# Patient Record
Sex: MALE | Race: BLACK OR AFRICAN AMERICAN | NOT HISPANIC OR LATINO | Employment: OTHER | URBAN - METROPOLITAN AREA
[De-identification: names, ages, dates, MRNs, and addresses within clinical notes are randomized per-mention and may not be internally consistent; named-entity substitution may affect disease eponyms.]

---

## 2017-08-12 ENCOUNTER — ANESTHESIA EVENT (OUTPATIENT)
Dept: SURGERY | Facility: CLINIC | Age: 27
End: 2017-08-12
Payer: COMMERCIAL

## 2017-09-01 RX ORDER — BUDESONIDE 0.5 MG/2ML
0.5 INHALANT ORAL 2 TIMES DAILY PRN
COMMUNITY

## 2017-09-01 NOTE — OR NURSING
PAN RN did Pre Call with Mother/Legal Guardian, Natalia Jesus.  She was intstructed to bring Legal Guardian Documents on DOS , as she will be accompanying patient on DOS.  Cell: [phone]

## 2017-09-06 ENCOUNTER — ANESTHESIA (OUTPATIENT)
Dept: SURGERY | Facility: CLINIC | Age: 27
End: 2017-09-06
Payer: COMMERCIAL

## 2017-09-06 ENCOUNTER — HOSPITAL ENCOUNTER (OUTPATIENT)
Facility: CLINIC | Age: 27
Discharge: HOME | End: 2017-09-06
Attending: DENTIST | Admitting: DENTIST
Payer: COMMERCIAL

## 2017-09-06 VITALS
DIASTOLIC BLOOD PRESSURE: 86 MMHG | HEIGHT: 60 IN | BODY MASS INDEX: 19.61 KG/M2 | TEMPERATURE: 97.8 F | OXYGEN SATURATION: 98 % | SYSTOLIC BLOOD PRESSURE: 150 MMHG | WEIGHT: 99.9 LBS | RESPIRATION RATE: 13 BRPM

## 2017-09-06 PROCEDURE — 25000125 ZZHC RX 250: Performed by: NURSE ANESTHETIST, CERTIFIED REGISTERED

## 2017-09-06 PROCEDURE — 71000015 ZZH RECOVERY PHASE 1 LEVEL 2 EA ADDTL HR: Performed by: DENTIST

## 2017-09-06 PROCEDURE — 71000014 ZZH RECOVERY PHASE 1 LEVEL 2 FIRST HR: Performed by: DENTIST

## 2017-09-06 PROCEDURE — 27210794 ZZH OR GENERAL SUPPLY STERILE: Performed by: DENTIST

## 2017-09-06 PROCEDURE — 25000132 ZZH RX MED GY IP 250 OP 250 PS 637: Performed by: ANESTHESIOLOGY

## 2017-09-06 PROCEDURE — 25000125 ZZHC RX 250: Performed by: DENTIST

## 2017-09-06 PROCEDURE — 40000171 ZZH STATISTIC PRE-PROCEDURE ASSESSMENT III: Performed by: DENTIST

## 2017-09-06 PROCEDURE — 25000128 H RX IP 250 OP 636: Performed by: NURSE ANESTHETIST, CERTIFIED REGISTERED

## 2017-09-06 PROCEDURE — 36000051 ZZH SURGERY LEVEL 2 1ST 30 MIN - UMMC: Performed by: DENTIST

## 2017-09-06 PROCEDURE — 36000053 ZZH SURGERY LEVEL 2 EA 15 ADDTL MIN - UMMC: Performed by: DENTIST

## 2017-09-06 PROCEDURE — 25000132 ZZH RX MED GY IP 250 OP 250 PS 637: Performed by: DENTIST

## 2017-09-06 PROCEDURE — 25000566 ZZH SEVOFLURANE, EA 15 MIN: Performed by: DENTIST

## 2017-09-06 PROCEDURE — 71000027 ZZH RECOVERY PHASE 2 EACH 15 MINS: Performed by: DENTIST

## 2017-09-06 PROCEDURE — 37000008 ZZH ANESTHESIA TECHNICAL FEE, 1ST 30 MIN: Performed by: DENTIST

## 2017-09-06 PROCEDURE — 37000009 ZZH ANESTHESIA TECHNICAL FEE, EACH ADDTL 15 MIN: Performed by: DENTIST

## 2017-09-06 RX ORDER — ONDANSETRON 4 MG/1
4 TABLET, ORALLY DISINTEGRATING ORAL EVERY 30 MIN PRN
Status: DISCONTINUED | OUTPATIENT
Start: 2017-09-06 | End: 2017-09-06 | Stop reason: HOSPADM

## 2017-09-06 RX ORDER — NEOSTIGMINE METHYLSULFATE 1 MG/ML
VIAL (ML) INJECTION PRN
Status: DISCONTINUED | OUTPATIENT
Start: 2017-09-06 | End: 2017-09-06

## 2017-09-06 RX ORDER — DEXAMETHASONE SODIUM PHOSPHATE 4 MG/ML
4 INJECTION, SOLUTION INTRA-ARTICULAR; INTRALESIONAL; INTRAMUSCULAR; INTRAVENOUS; SOFT TISSUE
Status: DISCONTINUED | OUTPATIENT
Start: 2017-09-06 | End: 2017-09-06 | Stop reason: HOSPADM

## 2017-09-06 RX ORDER — ONDANSETRON 2 MG/ML
INJECTION INTRAMUSCULAR; INTRAVENOUS PRN
Status: DISCONTINUED | OUTPATIENT
Start: 2017-09-06 | End: 2017-09-06

## 2017-09-06 RX ORDER — ONDANSETRON 2 MG/ML
4 INJECTION INTRAMUSCULAR; INTRAVENOUS EVERY 30 MIN PRN
Status: DISCONTINUED | OUTPATIENT
Start: 2017-09-06 | End: 2017-09-06 | Stop reason: HOSPADM

## 2017-09-06 RX ORDER — CHLORHEXIDINE GLUCONATE ORAL RINSE 1.2 MG/ML
SOLUTION DENTAL PRN
Status: DISCONTINUED | OUTPATIENT
Start: 2017-09-06 | End: 2017-09-06 | Stop reason: HOSPADM

## 2017-09-06 RX ORDER — CEFAZOLIN SODIUM 2 G/100ML
2 INJECTION, SOLUTION INTRAVENOUS
Status: DISCONTINUED | OUTPATIENT
Start: 2017-09-06 | End: 2017-09-06 | Stop reason: HOSPADM

## 2017-09-06 RX ORDER — DEXAMETHASONE SODIUM PHOSPHATE 4 MG/ML
INJECTION, SOLUTION INTRA-ARTICULAR; INTRALESIONAL; INTRAMUSCULAR; INTRAVENOUS; SOFT TISSUE PRN
Status: DISCONTINUED | OUTPATIENT
Start: 2017-09-06 | End: 2017-09-06

## 2017-09-06 RX ORDER — PROPOFOL 10 MG/ML
INJECTION, EMULSION INTRAVENOUS PRN
Status: DISCONTINUED | OUTPATIENT
Start: 2017-09-06 | End: 2017-09-06

## 2017-09-06 RX ORDER — FENTANYL CITRATE 50 UG/ML
25-50 INJECTION, SOLUTION INTRAMUSCULAR; INTRAVENOUS
Status: DISCONTINUED | OUTPATIENT
Start: 2017-09-06 | End: 2017-09-06 | Stop reason: HOSPADM

## 2017-09-06 RX ORDER — MEPERIDINE HYDROCHLORIDE 25 MG/ML
12.5 INJECTION INTRAMUSCULAR; INTRAVENOUS; SUBCUTANEOUS EVERY 5 MIN PRN
Status: DISCONTINUED | OUTPATIENT
Start: 2017-09-06 | End: 2017-09-06 | Stop reason: HOSPADM

## 2017-09-06 RX ORDER — SODIUM CHLORIDE, SODIUM LACTATE, POTASSIUM CHLORIDE, CALCIUM CHLORIDE 600; 310; 30; 20 MG/100ML; MG/100ML; MG/100ML; MG/100ML
INJECTION, SOLUTION INTRAVENOUS CONTINUOUS
Status: DISCONTINUED | OUTPATIENT
Start: 2017-09-06 | End: 2017-09-06 | Stop reason: HOSPADM

## 2017-09-06 RX ORDER — BACITRACIN ZINC 500 [USP'U]/G
OINTMENT TOPICAL PRN
Status: DISCONTINUED | OUTPATIENT
Start: 2017-09-06 | End: 2017-09-06 | Stop reason: HOSPADM

## 2017-09-06 RX ORDER — FENTANYL CITRATE 50 UG/ML
INJECTION, SOLUTION INTRAMUSCULAR; INTRAVENOUS PRN
Status: DISCONTINUED | OUTPATIENT
Start: 2017-09-06 | End: 2017-09-06

## 2017-09-06 RX ORDER — GLYCOPYRROLATE 0.2 MG/ML
INJECTION, SOLUTION INTRAMUSCULAR; INTRAVENOUS PRN
Status: DISCONTINUED | OUTPATIENT
Start: 2017-09-06 | End: 2017-09-06

## 2017-09-06 RX ORDER — LIDOCAINE HYDROCHLORIDE 20 MG/ML
INJECTION, SOLUTION INFILTRATION; PERINEURAL PRN
Status: DISCONTINUED | OUTPATIENT
Start: 2017-09-06 | End: 2017-09-06

## 2017-09-06 RX ORDER — SODIUM CHLORIDE, SODIUM LACTATE, POTASSIUM CHLORIDE, CALCIUM CHLORIDE 600; 310; 30; 20 MG/100ML; MG/100ML; MG/100ML; MG/100ML
INJECTION, SOLUTION INTRAVENOUS CONTINUOUS PRN
Status: DISCONTINUED | OUTPATIENT
Start: 2017-09-06 | End: 2017-09-06

## 2017-09-06 RX ORDER — NALOXONE HYDROCHLORIDE 0.4 MG/ML
.1-.4 INJECTION, SOLUTION INTRAMUSCULAR; INTRAVENOUS; SUBCUTANEOUS
Status: DISCONTINUED | OUTPATIENT
Start: 2017-09-06 | End: 2017-09-06 | Stop reason: HOSPADM

## 2017-09-06 RX ADMIN — FENTANYL CITRATE 100 MCG: 50 INJECTION, SOLUTION INTRAMUSCULAR; INTRAVENOUS at 07:22

## 2017-09-06 RX ADMIN — Medication 0.45 MG: at 09:00

## 2017-09-06 RX ADMIN — DEXAMETHASONE SODIUM PHOSPHATE 8 MG: 4 INJECTION, SOLUTION INTRAMUSCULAR; INTRAVENOUS at 07:50

## 2017-09-06 RX ADMIN — PHENYLEPHRINE HYDROCHLORIDE 100 MCG: 10 INJECTION, SOLUTION INTRAMUSCULAR; INTRAVENOUS; SUBCUTANEOUS at 08:17

## 2017-09-06 RX ADMIN — PHENYLEPHRINE HYDROCHLORIDE 100 MCG: 10 INJECTION, SOLUTION INTRAMUSCULAR; INTRAVENOUS; SUBCUTANEOUS at 08:27

## 2017-09-06 RX ADMIN — ONDANSETRON 4 MG: 2 INJECTION INTRAMUSCULAR; INTRAVENOUS at 08:55

## 2017-09-06 RX ADMIN — ACETAMINOPHEN 650 MG: 325 SOLUTION ORAL at 10:46

## 2017-09-06 RX ADMIN — FENTANYL CITRATE 50 MCG: 50 INJECTION, SOLUTION INTRAMUSCULAR; INTRAVENOUS at 08:35

## 2017-09-06 RX ADMIN — SODIUM CHLORIDE, POTASSIUM CHLORIDE, SODIUM LACTATE AND CALCIUM CHLORIDE: 600; 310; 30; 20 INJECTION, SOLUTION INTRAVENOUS at 08:04

## 2017-09-06 RX ADMIN — LIDOCAINE HYDROCHLORIDE 60 MG: 20 INJECTION, SOLUTION INFILTRATION; PERINEURAL at 07:22

## 2017-09-06 RX ADMIN — PROPOFOL 120 MG: 10 INJECTION, EMULSION INTRAVENOUS at 07:22

## 2017-09-06 RX ADMIN — SODIUM CHLORIDE, POTASSIUM CHLORIDE, SODIUM LACTATE AND CALCIUM CHLORIDE: 600; 310; 30; 20 INJECTION, SOLUTION INTRAVENOUS at 07:17

## 2017-09-06 RX ADMIN — MIDAZOLAM HYDROCHLORIDE 2 MG: 1 INJECTION, SOLUTION INTRAMUSCULAR; INTRAVENOUS at 07:17

## 2017-09-06 RX ADMIN — PHENYLEPHRINE HYDROCHLORIDE 100 MCG: 10 INJECTION, SOLUTION INTRAMUSCULAR; INTRAVENOUS; SUBCUTANEOUS at 07:50

## 2017-09-06 RX ADMIN — Medication 40 MG: at 07:22

## 2017-09-06 RX ADMIN — Medication 2.25 MG: at 09:00

## 2017-09-06 RX ADMIN — PHENYLEPHRINE HYDROCHLORIDE 100 MCG: 10 INJECTION, SOLUTION INTRAMUSCULAR; INTRAVENOUS; SUBCUTANEOUS at 08:04

## 2017-09-06 ASSESSMENT — ENCOUNTER SYMPTOMS: SEIZURES: 1

## 2017-09-06 NOTE — ANESTHESIA POSTPROCEDURE EVALUATION
Patient: Jose Jesus-[place]    Procedure(s):  Dental Exam, Radiographs, Periodontal Therapy, Floride Varnish  - Wound Class: II-Clean Contaminated    Diagnosis:Dental Caries, Periodontal Disease   Diagnosis Additional Information: No value filed.    Anesthesia Type:  General    Note:  Anesthesia Post Evaluation    Patient location during evaluation: PACU  Patient participation: Unable to participate in evaluation secondary to underlying medical condition  Level of consciousness: awake  Pain management: adequate  Airway patency: patent  Respiratory status: acceptable  Hydration status: acceptable  PONV: none     Anesthetic complications: None          Last vitals:  Vitals:    09/06/17 0559   BP: 144/88   Resp: 18   Temp: 36.4  C (97.5  F)   SpO2: 97%         Electronically Signed By: Balwinder Duncan MD, MD  September 6, 2017  9:13 AM

## 2017-09-06 NOTE — IP AVS SNAPSHOT
MAIN OR    [address]    [place] [address]    Phone:  [phone]                                       After Visit Summary   9/6/2017    Jose Gomez    MRN: [id number]           After Visit Summary Signature Page     I have received my discharge instructions, and my questions have been answered. I have discussed any challenges I see with this plan with the nurse or doctor.    ..........................................................................................................................................  Patient/Patient Representative Signature      ..........................................................................................................................................  Patient Representative Print Name and Relationship to Patient    ..................................................               ................................................  Date                                            Time    ..........................................................................................................................................  Reviewed by Signature/Title    ...................................................              ..............................................  Date                                                            Time

## 2017-09-06 NOTE — OR NURSING
Pt arrived to PACU with simple face mask in place.  Upon auscultation, minimal to no LS audible.  Pt's O2 saturations dropped to 86% but recovered with jaw lift and pt being told to take deep breaths.  Nasal trumpet passed into left nare with good air exchange immediately after placement.  Mom at bedside and educated on nasal trumpet necessity at this point.  Will continue to monitor.

## 2017-09-06 NOTE — OP NOTE
DATE OF SERVICE:  09/06/2017       It was deemed necessary for this patient to be seen in the hospital operating room because of cerebral palsy, epilepsy, spastic quadriplegic and the inability to be treated in the traditional dental clinic setting.      Under general anesthesia, the following operations were performed in the mouth:   1.  Bilateral dental examination.   2.  Dental radiographs and prophylaxis and fluoride varnish application.      ATTENDING PHYSICIAN:  Endy Brenner DDS      ASSISTANT RESIDENT:  Ramiro Hyde DDS      ANESTHESIOLOGIST:  Dr. Perla MD      SCRUB NURSE:  Mayuri Wills     CIRCULATING NURSE:  Mable Rothman.      CRNA:  Neema Jameson     PREOPERATIVE DIAGNOSIS:  Suspected periodontal disease and dental caries.      POSTOPERATIVE DIAGNOSES:  Generalized early periodontitis.      DESCRIPTION OF PROCEDURE:  General anesthesia start.  The patient was brought into the operating room and draped in the usual customary [facility], [place] fashion.  Following the timeout procedures, general anesthesia was administered through the patient's left naris.  The bilateral dental exam was performed.  A full series of 3 periapical and 4 bite wing radiographs were obtained and interpreted.  A moist throat pack was placed at 8:31 a.m.  Clinical examination revealed generalized moderate plaque and mild subgingival calculus, generalized bleeding on probing,  periodontal pockets ranging from 3-7 mm and macroglossia.  Radiographic examination revealed normal bone trabeculation and generalized horizontal bone loss.  No local anesthesia was used.        The following procedures were performed:  Periodontal therapy was performed on all teeth using ultrasonic debridement, supragingival and subgingival scaling and root cleaning with rubber cup polishing and floss.  Fluoride varnish was applied to all teeth.  The throat pack was removed with suction at 8:59 a.m.  The oropharynx was  inspected and found to be clear.  Estimated blood loss was 2 mL.      The attending doctor, Dr. Shirley, was present the entire procedure.  General anesthesia finished.  The patient was extubated in the operating room and taken to post-anesthesia care unit in good condition.         CULLEN SHIRLEY DDS       As dictated by MARIBEL BIRD MD            D: 2017 10:10   T: 2017 11:08   MT: BRONWYN      Name:     RUFINA LONDON   MRN:      [id number]        Account:        [id number]   :      1990           Procedure Date: 2017      Document: [id number]

## 2017-09-06 NOTE — BRIEF OP NOTE
[facility], [place]    Brief Operative Note    Pre-operative diagnosis: Dental Caries, Periodontal Disease   Post-operative diagnosis Chronic generalized gingivitis  Procedure: Procedure(s):  Dental Exam, Radiographs, Periodontal Therapy, Floride Varnish  - Wound Class: II-Clean Contaminated  Surgeon: Surgeon(s) and Role:     * Endy Brenner DDS - Primary     * Ramiro Hyde MD - Resident - Assisting  Anesthesia: General   Estimated blood loss: 2 cc  Drains: None  Specimens: * No specimens in log *  Findings:   None.  Complications: None.  Implants: None.

## 2017-09-06 NOTE — IP AVS SNAPSHOT
MRN:[id number]                      After Visit Summary   9/6/2017    Jose Gomez    MRN: [id number]           Thank you!     Thank you for choosing [facility] for your care. Our goal is always to provide you with excellent care. Hearing back from our patients is one way we can continue to improve our services. Please take a few minutes to complete the written survey that you may receive in the mail after you visit with us. Thank you!        Patient Information     Date Of Birth          1990        About your hospital stay     You were admitted on:  September 6, 2017 You last received care in the:  [facility] OR    You were discharged on:  September 6, 2017       Who to Call     For medical emergencies, please call 911.  For non-urgent questions about your medical care, please call your primary care provider or clinic, [phone]  For questions related to your surgery, please call your surgery clinic        Attending Provider     Provider Endy Richards DDS Dentist       Primary Care Provider Office Phone # Fax #    Jone Briseno [phone]      After Care Instructions     Discharge Instructions       Return to [facility] dental clinic in 6 months for recall and follow up.  Clinic phone [phone]  Emergency post op care call: [phone], ask for the dental resident on call.    Post-operative oral surgery instructions:   Care of the mouth following a surgical procedure is essential in the healing process.  There is a certain amount of swelling, discoloration, discomfort and bleeding which can be expected.     Bleeding:  Some bleeding and oozing is to be expected for several hours.  Avoid spitting, rinsing, swishing, and use of a straw for the next 24-48 hours, as they may provoke oozing.  Keep firm pressure on the gauze pack for 30 minutes and then discard.  If bleeding is more than slight, use sterile gauze or a moistened tea bag over the area and  again apply firm pressure for 30 minutes.     Discomfort:   If prescription was given, use as directed.  The prescription should be filled promptly and taken exactly as directed before the local anesthetic wears off.  Do not take pain medication on an empty stomach as it may cause nausea.  If a prescription was not given, over-the-counter medications (Tylenol or Advil) can be taken as directed.     Swelling:  Some degree of swelling is normal and can be minimized with the use of ice or cold packs applied to the extraction site for 15-20 minutes and then removed for 15-20 minutes.  This should only be done for the first 24 hours, after 24 hours use a warm moist compress over the extraction site for 15-20 minutes and then remove for 15-20 minutes.  Maximum swelling will occur about the second or third post-operative day and then slowly recede.     Diet:  A soft or liquid diet is recommended for the first few days following surgery, advance as tolerated. Until local anesthesia (numbness) wears off, be careful chewing to prevent biting the numb area.     Care of the mouth:  Do not rinse your mouth for 24 hours after surgery.  After 24 hours, begin gentle warm salt water rinses for one week and resume brushing of remaining teeth.  Avoid use of alcohol, smoking, or carbonated drinks for 24-48 hours after surgery.  This may interfere with clot formation and slow the healing process.  Smoking is the primary cause of dry sockets.     Rest:  Rest as much as possible for the next 24 hours, avoiding any excessive amount of physical activity.     Note:  1. Return to work or school in 24-48 hours.                  Further instructions from your care team       Same-Day Surgery   Adult Discharge Orders & Instructions     For 24 hours after surgery:  1. Get plenty of rest.  A responsible adult must stay with you for at least 24 hours after you leave the hospital.   2. Pain medication can slow your reflexes. Do not drive or use  heavy equipment.  If you have weakness or tingling, don't drive or use heavy equipment until this feeling goes away.  3. Mixing alcohol and pain medication can cause dizziness and slow your breathing. It can even be fatal. Do not drink alcohol while taking pain medication.  4. Avoid strenuous or risky activities.  Ask for help when climbing stairs.   5. You may feel lightheaded.  If so, sit for a few minutes before standing.  Have someone help you get up.   6. If you have nausea (feel sick to your stomach), drink only clear liquids such as apple juice, ginger ale, broth or 7-Up.  Rest may also help.  Be sure to drink enough fluids.  Move to a regular diet as you feel able. Take pain medications with a small amount of solid food, such as toast or crackers, to avoid nausea.   7. A slight fever is normal. Call the doctor if your fever is over 100 F (37.7 C) (taken under the tongue) or lasts longer than 24 hours.  8. You may have a dry mouth, muscle aches, trouble sleeping or a sore throat.  These symptoms should go away after 24 hours.  9. Do not make important or legal decisions.   Pain Management:      1. Take pain medication (if prescribed) for pain as directed by your physician.        2. WARNING: If the pain medication you have been prescribed contains Tylenol  (acetaminophen), DO NOT take additional doses of Tylenol (acetaminophen).     Call your doctor for any of the followin.  Signs of infection (fever, growing tenderness at the surgery site, severe pain, a large amount of drainage or bleeding, foul-smelling drainage, redness, swelling).    2.  It has been over 8 to 10 hours since surgery and you are still not able to urinate (pee).    3.  Headache for over 24 hours.    4.  Numbness, tingling or weakness the day after surgery (if you had spinal anesthesia).  To contact a doctor, call _____________________________________ or:      [phone] and ask for the Resident On Call for:           "__________________________________________ (answered 24 hours a day)      Emergency Department:  [facility]: [phone]  [facility]: [phone]               Rev. 10/2014       Pending Results     No orders found from 2017 to 2017.            Admission Information     Date & Time Provider Department Dept. Phone    2017 Endy Brenner DDS  MAIN [phone]      Your Vitals Were     Blood Pressure Temperature Respirations Height Weight Pulse Oximetry    144/88 97.5  F (36.4  C) (Oral) 18 1.499 m (4' 11\") 45.3 kg (99 lb 14.4 oz) 97%    BMI (Body Mass Index)                   20.18 kg/m2           MyChart Information     [employer] lets you send messages to your doctor, view your test results, renew your prescriptions, schedule appointments and more. To sign up, go to [email or web address] . Click on \"Log in\" on the left side of the screen, which will take you to the Welcome page. Then click on \"Sign up Now\" on the right side of the page.     You will be asked to enter the access code listed below, as well as some personal information. Please follow the directions to create your username and password.     Your access code is: [email or web address]  Expires: 2017  9:19 AM     Your access code will  in 90 days. If you need help or a new code, please call your [facility] clinic or [phone].        Care EveryWhere ID     This is your Care EveryWhere ID. This could be used by other organizations to access your [facility] medical records  [id number]        Equal Access to Services     RON DUBOIS : Yesenia Hoover, yissel britt, qabritneyta marian jason. So [facility] [phone].    ATENCIÓN: Si habla español, tiene a león disposición servicios gratuitos de asistencia lingüística. Llame al [phone].    We comply with applicable federal civil rights laws and [place] laws. We do not discriminate " on the basis of race, color, national origin, age, disability sex, sexual orientation or gender identity.               Review of your medicines      CONTINUE these medicines which have NOT CHANGED        Dose / Directions    acetaminophen 32 mg/mL solution   Commonly known as:  TYLENOL   Used for:  Periodontal disease, Dental caries extending into dentine        Dose:  650 mg   Take 20.3 mLs by mouth every 4 hours as needed.   Quantity:  120 mL   Refills:  0       albuterol (2.5 MG/3ML) 0.083% neb solution        Dose:  1 ampule   Take 1 ampule by nebulization every 6 hours as needed.   Refills:  0       budesonide 0.5 MG/2ML neb solution   Commonly known as:  PULMICORT        Dose:  0.5 mg   Take 0.5 mg by nebulization 2 times daily as needed   Refills:  0       TRILEPTAL PO        Take  by mouth.   Refills:  0                Protect others around you: Learn how to safely use, store and throw away your medicines at www.disposemymeds.org.             Medication List: This is a list of all your medications and when to take them. Check marks below indicate your daily home schedule. Keep this list as a reference.      Medications           Morning Afternoon Evening Bedtime As Needed    acetaminophen 32 mg/mL solution   Commonly known as:  TYLENOL   Take 20.3 mLs by mouth every 4 hours as needed.                                albuterol (2.5 MG/3ML) 0.083% neb solution   Take 1 ampule by nebulization every 6 hours as needed.                                budesonide 0.5 MG/2ML neb solution   Commonly known as:  PULMICORT   Take 0.5 mg by nebulization 2 times daily as needed                                TRILEPTAL PO   Take  by mouth.

## 2017-09-06 NOTE — ANESTHESIA PREPROCEDURE EVALUATION
Anesthesia Evaluation     . Pt has had prior anesthetic.            ROS/MED HX    ENT/Pulmonary:     (+)Intermittent asthma Treatment: Inhaler prn,  , . .    Neurologic:  - neg neurologic ROS   (+)seizures (spastic Quadraplegia) Developmental delay      Cardiovascular:  - neg cardiovascular ROS       METS/Exercise Tolerance:     Hematologic:  - neg hematologic  ROS       Musculoskeletal:  - neg musculoskeletal ROS       GI/Hepatic:         Renal/Genitourinary:  - ROS Renal section negative       Endo:  - neg endo ROS       Psychiatric:  - neg psychiatric ROS       Infectious Disease:         Malignancy:      - no malignancy   Other:                     Physical Exam  Normal systems: cardiovascular and pulmonary    Airway   Mallampati: II  TM distance: >3 FB  Neck ROM: full    Dental   (+) other    Cardiovascular       Pulmonary                     Anesthesia Plan      History & Physical Review  History and physical reviewed and following examination; no interval change.    ASA Status:  3 .        Plan for General with Propofol and Intravenous induction. Maintenance will be Balanced.    PONV prophylaxis:  Ondansetron (or other 5HT-3) and Dexamethasone or Solumedrol  Additional equipment: Videolaryngoscope      Postoperative Care  Postoperative pain management:  IV analgesics and Oral pain medications.      Consents  Anesthetic plan, risks, benefits and alternatives discussed with:  Patient.  Use of blood products discussed: No .   .                          .

## 2017-09-06 NOTE — DISCHARGE INSTRUCTIONS
Same-Day Surgery   Adult Discharge Orders & Instructions     For 24 hours after surgery:  1. Get plenty of rest.  A responsible adult must stay with you for at least 24 hours after you leave the hospital.   2. Pain medication can slow your reflexes. Do not drive or use heavy equipment.  If you have weakness or tingling, don't drive or use heavy equipment until this feeling goes away.  3. Mixing alcohol and pain medication can cause dizziness and slow your breathing. It can even be fatal. Do not drink alcohol while taking pain medication.  4. Avoid strenuous or risky activities.  Ask for help when climbing stairs.   5. You may feel lightheaded.  If so, sit for a few minutes before standing.  Have someone help you get up.   6. If you have nausea (feel sick to your stomach), drink only clear liquids such as apple juice, ginger ale, broth or 7-Up.  Rest may also help.  Be sure to drink enough fluids.  Move to a regular diet as you feel able. Take pain medications with a small amount of solid food, such as toast or crackers, to avoid nausea.   7. A slight fever is normal. Call the doctor if your fever is over 100 F (37.7 C) (taken under the tongue) or lasts longer than 24 hours.  8. You may have a dry mouth, muscle aches, trouble sleeping or a sore throat.  These symptoms should go away after 24 hours.  9. Do not make important or legal decisions.   Pain Management:      1. Take pain medication (if prescribed) for pain as directed by your physician.        2. WARNING: If the pain medication you have been prescribed contains Tylenol  (acetaminophen), DO NOT take additional doses of Tylenol (acetaminophen).     Call your doctor for any of the followin.  Signs of infection (fever, growing tenderness at the surgery site, severe pain, a large amount of drainage or bleeding, foul-smelling drainage, redness, swelling).    2.  It has been over 8 to 10 hours since surgery and you are still not able to urinate (pee).    3.   Headache for over 24 hours.    4.  Numbness, tingling or weakness the day after surgery (if you had spinal anesthesia).  To contact a doctor, call _____________________________________ or:      [phone] and ask for the Resident On Call for:          __________________________________________ (answered 24 hours a day)      Emergency Department:  [facility]: [phone]  [facility]: [phone]               Rev. 10/2014

## 2017-09-06 NOTE — ANESTHESIA CARE TRANSFER NOTE
Patient: Jose eJsus-Kramer    Procedure(s):  Dental Exam, Radiographs, Periodontal Therapy, Floride Varnish  - Wound Class: II-Clean Contaminated    Diagnosis: Dental Caries, Periodontal Disease   Diagnosis Additional Information: No value filed.    Anesthesia Type:   General     Note:  Airway :Nasopharyngeal Airway and Face Mask  Patient transferred to:PACU  Comments: Arrived in PACU, report to RN, vitals stable, patient comfortable. Occasional obstruction, but responds to verbal direction to take a breath. Nasal airway size 32 placed in left nare with resolution.        Vitals: (Last set prior to Anesthesia Care Transfer)    CRNA VITALS  9/6/2017 0903 - 9/6/2017 0944      9/6/2017             NIBP: 149/80    NIBP Mean: 107                Electronically Signed By: KEAGAN Lopez CRNA  September 6, 2017  9:44 AM

## 2021-12-15 ENCOUNTER — HOSPITAL ENCOUNTER (OUTPATIENT)
Facility: CLINIC | Age: 31
End: 2021-12-15
Attending: DENTIST | Admitting: DENTIST
Payer: COMMERCIAL

## 2021-12-15 DIAGNOSIS — Z11.59 ENCOUNTER FOR SCREENING FOR OTHER VIRAL DISEASES: ICD-10-CM

## 2023-11-07 ENCOUNTER — DOCUMENTATION ONLY (OUTPATIENT)
Dept: OTHER | Facility: CLINIC | Age: 33
End: 2023-11-07
Payer: COMMERCIAL

## 2023-11-07 RX ORDER — LEVETIRACETAM 500 MG/1
500 TABLET ORAL 2 TIMES DAILY
COMMUNITY

## 2023-11-08 ENCOUNTER — ANESTHESIA EVENT (OUTPATIENT)
Dept: SURGERY | Facility: CLINIC | Age: 33
End: 2023-11-08
Payer: COMMERCIAL

## 2023-11-08 ASSESSMENT — ENCOUNTER SYMPTOMS: SEIZURES: 1

## 2023-11-08 NOTE — ANESTHESIA PREPROCEDURE EVALUATION
Anesthesia Pre-Procedure Evaluation    Patient: Jose Melgar   MRN: [id number] : 1990        Procedure : Procedure(s):  Bilateral dental exam, radiographs, dental restorations, dental extractions, pulpotomy, root canal therapy, biopsy, frenectomy, gingivectomy, alveoloplasty, periodontal therapy, fluoride varnish in the mouth          Past Medical History:   Diagnosis Date    Seizures (H)     Spastic quadriplegia (H)       Past Surgical History:   Procedure Laterality Date    EXAM UNDER ANESTHESIA, RESTORATIONS, EXTRACTION(S) DENTAL COMPLEX, COMBINED  2013    Procedure: COMBINED EXAM UNDER ANESTHESIA, RESTORATIONS, EXTRACTION(S) DENTAL COMPLEX;  Dental Exam,   Radiograph,   Restorations,   Periodontal Therapy,   Extractions x1,   sealants x15;  Surgeon: Marcelo Sellers DDS;  Location: [facility] OR    EXAM UNDER ANESTHESIA, RESTORATIONS, EXTRACTION(S) DENTAL, COMBINED N/A 2017    Procedure: COMBINED EXAM UNDER ANESTHESIA, RESTORATIONS, EXTRACTION(S) DENTAL;  Dental Exam, Radiographs, Periodontal Therapy, Floride Varnish ;  Surgeon: Endy Brenner DDS;  Location: [facility] OR    RESECTION ILEOCECAL        No Known Allergies   Social History     Tobacco Use    Smoking status: Never    Smokeless tobacco: Never   Substance Use Topics    Alcohol use: No      Wt Readings from Last 1 Encounters:   17 45.3 kg (99 lb 14.4 oz)        Anesthesia Evaluation   Pt has had prior anesthetic. Type: General.        ROS/MED HX  ENT/Pulmonary:     (+)                    Intermittent, asthma  Treatment: Inhaler prn,                 Neurologic: Comment: Spastic Quadriplegia  Cerebral palsy    (+)       seizures,                  Developmental delay,       Cardiovascular:  - neg cardiovascular ROS     METS/Exercise Tolerance:     Hematologic:  - neg hematologic  ROS     Musculoskeletal:  - neg musculoskeletal ROS     GI/Hepatic:  - neg GI/hepatic ROS     Renal/Genitourinary:  - neg Renal ROS     Endo:   "- neg endo ROS     Psychiatric/Substance Use:       Infectious Disease:  - neg infectious disease ROS     Malignancy:       Other:            Physical Exam    Airway      Comment: Unable to assess MP due to lack of cooperation.      TM distance: > 3 FB   Neck ROM: full     Respiratory Devices and Support         Dental       (+) Minor Abnormalities - some fillings, tiny chips      Cardiovascular   cardiovascular exam normal          Pulmonary   pulmonary exam normal                OUTSIDE LABS:  CBC:   Lab Results   Component Value Date    WBC 17.6 (H) 01/24/2013    WBC 11.0 01/23/2013    HGB 13.4 01/24/2013    HGB 16.7 01/23/2013    HCT 41.7 01/24/2013    HCT 51.1 01/23/2013     01/24/2013     01/23/2013     BMP:   Lab Results   Component Value Date     01/24/2013     01/24/2013    POTASSIUM 4.4 01/24/2013    POTASSIUM 4.5 01/24/2013    CHLORIDE 99 01/24/2013    CHLORIDE 98 01/24/2013    CO2 30 01/24/2013    CO2 30 01/24/2013    BUN 9 01/24/2013    BUN 10 01/24/2013    CR 0.80 01/24/2013    CR 0.80 01/24/2013    GLC 93 01/24/2013     (H) 01/24/2013     COAGS:   Lab Results   Component Value Date    PTT 31 01/23/2013    INR 1.07 01/23/2013     POC: No results found for: \"BGM\", \"HCG\", \"HCGS\"  HEPATIC:   Lab Results   Component Value Date    ALBUMIN 3.7 (L) 01/24/2013    PROTTOTAL 6.9 01/24/2013    ALT 28 01/24/2013    AST 23 01/24/2013    ALKPHOS 77 01/24/2013    BILITOTAL 0.5 01/24/2013     OTHER:   Lab Results   Component Value Date    MOHAMUD 8.6 01/24/2013    MAG 2.9 (H) 01/24/2013       Anesthesia Plan    ASA Status:  3    NPO Status:  NPO Appropriate    Anesthesia Type: General.     - Airway: ETT   Induction: Intravenous.   Maintenance: Balanced.   Techniques and Equipment:     - Airway: Video-Laryngoscope, Nasal VALERIA       Consents    Anesthesia Plan(s) and associated risks, benefits, and realistic alternatives discussed. Questions answered and patient/representative(s) expressed " understanding.     - Discussed:     - Discussed with:  Parent (Mother and/or Father)            Postoperative Care    Pain management: IV analgesics, Oral pain medications, Multi-modal analgesia.   PONV prophylaxis: Ondansetron (or other 5HT-3), Dexamethasone or Solumedrol     Comments:                Noemí Barton MD

## 2023-11-09 ENCOUNTER — ANESTHESIA (OUTPATIENT)
Dept: SURGERY | Facility: CLINIC | Age: 33
End: 2023-11-09
Payer: COMMERCIAL

## 2023-11-09 ENCOUNTER — DOCUMENTATION ONLY (OUTPATIENT)
Dept: OTHER | Facility: CLINIC | Age: 33
End: 2023-11-09
Payer: COMMERCIAL

## 2023-11-09 ENCOUNTER — HOSPITAL ENCOUNTER (OUTPATIENT)
Facility: CLINIC | Age: 33
Discharge: HOME | End: 2023-11-09
Attending: DENTIST | Admitting: DENTIST
Payer: COMMERCIAL

## 2023-11-09 VITALS
HEIGHT: 60 IN | DIASTOLIC BLOOD PRESSURE: 75 MMHG | WEIGHT: 110 LBS | RESPIRATION RATE: 21 BRPM | SYSTOLIC BLOOD PRESSURE: 130 MMHG | HEART RATE: 76 BPM | OXYGEN SATURATION: 98 % | BODY MASS INDEX: 21.6 KG/M2 | TEMPERATURE: 99 F

## 2023-11-09 PROCEDURE — 250N000009 HC RX 250

## 2023-11-09 PROCEDURE — 250N000013 HC RX MED GY IP 250 OP 250 PS 637: Performed by: ANESTHESIOLOGY

## 2023-11-09 PROCEDURE — 710N000010 HC RECOVERY PHASE 1, LEVEL 2, PER MIN: Performed by: DENTIST

## 2023-11-09 PROCEDURE — 272N000001 HC OR GENERAL SUPPLY STERILE: Performed by: DENTIST

## 2023-11-09 PROCEDURE — 370N000017 HC ANESTHESIA TECHNICAL FEE, PER MIN: Performed by: DENTIST

## 2023-11-09 PROCEDURE — 250N000013 HC RX MED GY IP 250 OP 250 PS 637

## 2023-11-09 PROCEDURE — 258N000003 HC RX IP 258 OP 636

## 2023-11-09 PROCEDURE — 710N000012 HC RECOVERY PHASE 2, PER MINUTE: Performed by: DENTIST

## 2023-11-09 PROCEDURE — 360N000075 HC SURGERY LEVEL 2, PER MIN: Performed by: DENTIST

## 2023-11-09 PROCEDURE — 250N000009 HC RX 250: Performed by: DENTIST

## 2023-11-09 PROCEDURE — 250N000013 HC RX MED GY IP 250 OP 250 PS 637: Performed by: DENTIST

## 2023-11-09 PROCEDURE — 250N000011 HC RX IP 250 OP 636

## 2023-11-09 PROCEDURE — 250N000025 HC SEVOFLURANE, PER MIN: Performed by: DENTIST

## 2023-11-09 PROCEDURE — 999N000141 HC STATISTIC PRE-PROCEDURE NURSING ASSESSMENT: Performed by: DENTIST

## 2023-11-09 RX ORDER — ONDANSETRON 4 MG/1
4 TABLET, ORALLY DISINTEGRATING ORAL EVERY 30 MIN PRN
Status: DISCONTINUED | OUTPATIENT
Start: 2023-11-09 | End: 2023-11-09 | Stop reason: HOSPADM

## 2023-11-09 RX ORDER — MIDAZOLAM HYDROCHLORIDE 2 MG/ML
10 SYRUP ORAL ONCE
Status: COMPLETED | OUTPATIENT
Start: 2023-11-09 | End: 2023-11-09

## 2023-11-09 RX ORDER — SODIUM CHLORIDE, SODIUM LACTATE, POTASSIUM CHLORIDE, CALCIUM CHLORIDE 600; 310; 30; 20 MG/100ML; MG/100ML; MG/100ML; MG/100ML
INJECTION, SOLUTION INTRAVENOUS CONTINUOUS
Status: DISCONTINUED | OUTPATIENT
Start: 2023-11-09 | End: 2023-11-09 | Stop reason: HOSPADM

## 2023-11-09 RX ORDER — OXYCODONE HYDROCHLORIDE 5 MG/1
5 TABLET ORAL
Status: DISCONTINUED | OUTPATIENT
Start: 2023-11-09 | End: 2023-11-09 | Stop reason: HOSPADM

## 2023-11-09 RX ORDER — DIMENHYDRINATE 50 MG/ML
25 INJECTION, SOLUTION INTRAMUSCULAR; INTRAVENOUS
Status: DISCONTINUED | OUTPATIENT
Start: 2023-11-09 | End: 2023-11-09 | Stop reason: HOSPADM

## 2023-11-09 RX ORDER — ACETAMINOPHEN 325 MG/1
650 TABLET ORAL ONCE
Status: DISCONTINUED | OUTPATIENT
Start: 2023-11-09 | End: 2023-11-09 | Stop reason: HOSPADM

## 2023-11-09 RX ORDER — FENTANYL CITRATE 50 UG/ML
INJECTION, SOLUTION INTRAMUSCULAR; INTRAVENOUS PRN
Status: DISCONTINUED | OUTPATIENT
Start: 2023-11-09 | End: 2023-11-09

## 2023-11-09 RX ORDER — FENTANYL CITRATE 50 UG/ML
25 INJECTION, SOLUTION INTRAMUSCULAR; INTRAVENOUS EVERY 5 MIN PRN
Status: DISCONTINUED | OUTPATIENT
Start: 2023-11-09 | End: 2023-11-09 | Stop reason: HOSPADM

## 2023-11-09 RX ORDER — PROPOFOL 10 MG/ML
INJECTION, EMULSION INTRAVENOUS PRN
Status: DISCONTINUED | OUTPATIENT
Start: 2023-11-09 | End: 2023-11-09

## 2023-11-09 RX ORDER — HALOPERIDOL 5 MG/ML
1 INJECTION INTRAMUSCULAR
Status: DISCONTINUED | OUTPATIENT
Start: 2023-11-09 | End: 2023-11-09 | Stop reason: HOSPADM

## 2023-11-09 RX ORDER — ONDANSETRON 2 MG/ML
4 INJECTION INTRAMUSCULAR; INTRAVENOUS EVERY 30 MIN PRN
Status: DISCONTINUED | OUTPATIENT
Start: 2023-11-09 | End: 2023-11-09 | Stop reason: HOSPADM

## 2023-11-09 RX ORDER — HYDROMORPHONE HYDROCHLORIDE 1 MG/ML
0.2 INJECTION, SOLUTION INTRAMUSCULAR; INTRAVENOUS; SUBCUTANEOUS EVERY 5 MIN PRN
Status: DISCONTINUED | OUTPATIENT
Start: 2023-11-09 | End: 2023-11-09 | Stop reason: HOSPADM

## 2023-11-09 RX ORDER — DIPHENHYDRAMINE HCL 25 MG
25 CAPSULE ORAL EVERY 6 HOURS PRN
Status: DISCONTINUED | OUTPATIENT
Start: 2023-11-09 | End: 2023-11-09 | Stop reason: HOSPADM

## 2023-11-09 RX ORDER — ACETAMINOPHEN 160 MG
TABLET,DISINTEGRATING ORAL PRN
Status: DISCONTINUED | OUTPATIENT
Start: 2023-11-09 | End: 2023-11-09 | Stop reason: HOSPADM

## 2023-11-09 RX ORDER — ACETAMINOPHEN 325 MG/1
975 TABLET ORAL ONCE
Status: DISCONTINUED | OUTPATIENT
Start: 2023-11-09 | End: 2023-11-09 | Stop reason: HOSPADM

## 2023-11-09 RX ORDER — LABETALOL HYDROCHLORIDE 5 MG/ML
10 INJECTION, SOLUTION INTRAVENOUS
Status: DISCONTINUED | OUTPATIENT
Start: 2023-11-09 | End: 2023-11-09 | Stop reason: HOSPADM

## 2023-11-09 RX ORDER — KETOROLAC TROMETHAMINE 30 MG/ML
INJECTION, SOLUTION INTRAMUSCULAR; INTRAVENOUS PRN
Status: DISCONTINUED | OUTPATIENT
Start: 2023-11-09 | End: 2023-11-09

## 2023-11-09 RX ORDER — OXYCODONE HYDROCHLORIDE 10 MG/1
10 TABLET ORAL
Status: DISCONTINUED | OUTPATIENT
Start: 2023-11-09 | End: 2023-11-09 | Stop reason: HOSPADM

## 2023-11-09 RX ORDER — LIDOCAINE HYDROCHLORIDE 20 MG/ML
INJECTION, SOLUTION INFILTRATION; PERINEURAL PRN
Status: DISCONTINUED | OUTPATIENT
Start: 2023-11-09 | End: 2023-11-09

## 2023-11-09 RX ORDER — SODIUM CHLORIDE, SODIUM LACTATE, POTASSIUM CHLORIDE, CALCIUM CHLORIDE 600; 310; 30; 20 MG/100ML; MG/100ML; MG/100ML; MG/100ML
INJECTION, SOLUTION INTRAVENOUS CONTINUOUS PRN
Status: DISCONTINUED | OUTPATIENT
Start: 2023-11-09 | End: 2023-11-09

## 2023-11-09 RX ORDER — ONDANSETRON 2 MG/ML
INJECTION INTRAMUSCULAR; INTRAVENOUS PRN
Status: DISCONTINUED | OUTPATIENT
Start: 2023-11-09 | End: 2023-11-09

## 2023-11-09 RX ORDER — ACETAMINOPHEN 325 MG/1
325 TABLET ORAL ONCE
Status: COMPLETED | OUTPATIENT
Start: 2023-11-09 | End: 2023-11-09

## 2023-11-09 RX ORDER — DEXAMETHASONE SODIUM PHOSPHATE 4 MG/ML
INJECTION, SOLUTION INTRA-ARTICULAR; INTRALESIONAL; INTRAMUSCULAR; INTRAVENOUS; SOFT TISSUE PRN
Status: DISCONTINUED | OUTPATIENT
Start: 2023-11-09 | End: 2023-11-09

## 2023-11-09 RX ORDER — DIPHENHYDRAMINE HYDROCHLORIDE 50 MG/ML
25 INJECTION INTRAMUSCULAR; INTRAVENOUS EVERY 6 HOURS PRN
Status: DISCONTINUED | OUTPATIENT
Start: 2023-11-09 | End: 2023-11-09 | Stop reason: HOSPADM

## 2023-11-09 RX ORDER — LIDOCAINE HYDROCHLORIDE AND EPINEPHRINE BITARTRATE 20; .01 MG/ML; MG/ML
INJECTION, SOLUTION SUBCUTANEOUS PRN
Status: DISCONTINUED | OUTPATIENT
Start: 2023-11-09 | End: 2023-11-09 | Stop reason: HOSPADM

## 2023-11-09 RX ORDER — CHLORHEXIDINE GLUCONATE ORAL RINSE 1.2 MG/ML
SOLUTION DENTAL PRN
Status: DISCONTINUED | OUTPATIENT
Start: 2023-11-09 | End: 2023-11-09 | Stop reason: HOSPADM

## 2023-11-09 RX ORDER — LIDOCAINE 40 MG/G
CREAM TOPICAL
Status: DISCONTINUED | OUTPATIENT
Start: 2023-11-09 | End: 2023-11-09 | Stop reason: HOSPADM

## 2023-11-09 RX ORDER — HYDROMORPHONE HYDROCHLORIDE 1 MG/ML
0.4 INJECTION, SOLUTION INTRAMUSCULAR; INTRAVENOUS; SUBCUTANEOUS EVERY 5 MIN PRN
Status: DISCONTINUED | OUTPATIENT
Start: 2023-11-09 | End: 2023-11-09 | Stop reason: HOSPADM

## 2023-11-09 RX ORDER — FENTANYL CITRATE 50 UG/ML
50 INJECTION, SOLUTION INTRAMUSCULAR; INTRAVENOUS EVERY 5 MIN PRN
Status: DISCONTINUED | OUTPATIENT
Start: 2023-11-09 | End: 2023-11-09 | Stop reason: HOSPADM

## 2023-11-09 RX ADMIN — PHENYLEPHRINE HYDROCHLORIDE 100 MCG: 10 INJECTION INTRAVENOUS at 09:08

## 2023-11-09 RX ADMIN — DEXAMETHASONE SODIUM PHOSPHATE 4 MG: 4 INJECTION, SOLUTION INTRA-ARTICULAR; INTRALESIONAL; INTRAMUSCULAR; INTRAVENOUS; SOFT TISSUE at 08:29

## 2023-11-09 RX ADMIN — LIDOCAINE HYDROCHLORIDE 80 MG: 20 INJECTION, SOLUTION INFILTRATION; PERINEURAL at 07:45

## 2023-11-09 RX ADMIN — KETOROLAC TROMETHAMINE 30 MG: 30 INJECTION, SOLUTION INTRAMUSCULAR at 09:50

## 2023-11-09 RX ADMIN — MIDAZOLAM HYDROCHLORIDE 10 MG: 2 SYRUP ORAL at 07:00

## 2023-11-09 RX ADMIN — PROPOFOL 120 MG: 10 INJECTION, EMULSION INTRAVENOUS at 07:45

## 2023-11-09 RX ADMIN — SODIUM CHLORIDE, POTASSIUM CHLORIDE, SODIUM LACTATE AND CALCIUM CHLORIDE: 600; 310; 30; 20 INJECTION, SOLUTION INTRAVENOUS at 07:40

## 2023-11-09 RX ADMIN — PHENYLEPHRINE HYDROCHLORIDE 100 MCG: 10 INJECTION INTRAVENOUS at 09:29

## 2023-11-09 RX ADMIN — PHENYLEPHRINE HYDROCHLORIDE 100 MCG: 10 INJECTION INTRAVENOUS at 08:38

## 2023-11-09 RX ADMIN — PHENYLEPHRINE HYDROCHLORIDE 100 MCG: 10 INJECTION INTRAVENOUS at 08:21

## 2023-11-09 RX ADMIN — PHENYLEPHRINE HYDROCHLORIDE 100 MCG: 10 INJECTION INTRAVENOUS at 08:32

## 2023-11-09 RX ADMIN — PHENYLEPHRINE HYDROCHLORIDE 100 MCG: 10 INJECTION INTRAVENOUS at 07:52

## 2023-11-09 RX ADMIN — FENTANYL CITRATE 25 MCG: 50 INJECTION INTRAMUSCULAR; INTRAVENOUS at 07:45

## 2023-11-09 RX ADMIN — PHENYLEPHRINE HYDROCHLORIDE 100 MCG: 10 INJECTION INTRAVENOUS at 07:54

## 2023-11-09 RX ADMIN — PROPOFOL 30 MG: 10 INJECTION, EMULSION INTRAVENOUS at 07:48

## 2023-11-09 RX ADMIN — ONDANSETRON 4 MG: 2 INJECTION INTRAMUSCULAR; INTRAVENOUS at 09:50

## 2023-11-09 RX ADMIN — FENTANYL CITRATE 25 MCG: 50 INJECTION INTRAMUSCULAR; INTRAVENOUS at 08:47

## 2023-11-09 RX ADMIN — ACETAMINOPHEN 325 MG: 325 TABLET, FILM COATED ORAL at 07:00

## 2023-11-09 ASSESSMENT — ACTIVITIES OF DAILY LIVING (ADL)
ADLS_ACUITY_SCORE: 39
ADLS_ACUITY_SCORE: 35
ADLS_ACUITY_SCORE: 39

## 2023-11-09 NOTE — BRIEF OP NOTE
[facility]    Brief Operative Note    Pre-operative diagnosis: Dental caries [K02.9]  Post-operative diagnosis Pericoronitis #32    Procedure: Bilateral dental exam, radiographs, dental extractions x1, periodontal therapy, fluoride varnish in the mouth, N/A - Mouth    Surgeon: Surgeon(s) and Role:     * Roberto Reynoso DDS - Primary     * Danny Cash MD - Resident - Assisting  Anesthesia: General   Estimated Blood Loss: 5 ml    Drains: None  Specimens: * No specimens in log *  Findings:   None.  Complications: None.  Implants: * No implants in log *

## 2023-11-09 NOTE — DISCHARGE INSTRUCTIONS

## 2023-11-09 NOTE — ANESTHESIA PROCEDURE NOTES
Airway       Patient location during procedure: OR       Procedure Start/Stop Times: 11/9/2023 8:13 AM  Staff -        Anesthesiologist:  Jigna Muñoz MD       Resident/Fellow: Noemí Barton MD       Performed By: anesthesiologist and resident  Consent for Airway        Urgency: elective  Indications and Patient Condition       Indications for airway management: natividad-procedural       Induction type:intravenous       Mask difficulty assessment: 2 - vent by mask + OA or adjuvant +/- NMBA    Final Airway Details       Final airway type: endotracheal airway       Successful airway: ETT - single and Oral  Endotracheal Airway Details        ETT size (mm): 7.0       Cuffed: yes       Successful intubation technique: video laryngoscopy       VL Blade Size: MAC 3       Grade View of Cords: 2       Adjucts: stylet       Position: Right       Measured from: gums/teeth       Secured at (cm): 21       Bite block used: None    Post intubation assessment        Placement verified by: capnometry, equal breath sounds and chest rise        Number of attempts at approach: 1       Number of other approaches attempted: 0       Secured with: pink tape       Ease of procedure: easy       Dentition: Intact and Unchanged    Medication(s) Administered   Medication Administration Time: 11/9/2023 8:13 AM

## 2023-11-09 NOTE — ANESTHESIA POSTPROCEDURE EVALUATION
Patient: Jose Melgar    Procedure: Procedure(s):  Bilateral dental exam, radiographs, dental extractions x1, periodontal therapy, fluoride varnish in the mouth       Anesthesia Type:  General    Note:  Disposition: Outpatient   Postop Pain Control: Uneventful            Sign Out: Well controlled pain   PONV: No   Neuro/Psych: Uneventful            Sign Out: Acceptable/Baseline neuro status   Airway/Respiratory: Uneventful            Sign Out: Acceptable/Baseline resp. status   CV/Hemodynamics: Uneventful            Sign Out: Acceptable CV status; No obvious hypovolemia; No obvious fluid overload   Other NRE: NONE   DID A NON-ROUTINE EVENT OCCUR? No           Last vitals:  Vitals Value Taken Time   /75 11/09/23 1045   Temp 36.2  C (97.2  F) 11/09/23 1030   Pulse 83 11/09/23 1051   Resp 9 11/09/23 1051   SpO2 93 % 11/09/23 1051   Vitals shown include unfiled device data.    Electronically Signed By: Jigna Muñoz MD  November 9, 2023  12:34 PM

## 2023-11-09 NOTE — ANESTHESIA CARE TRANSFER NOTE
Patient: Jose Melgar    Procedure: Procedure(s):  Bilateral dental exam, radiographs, dental extractions x1, periodontal therapy, fluoride varnish in the mouth       Diagnosis: Dental caries [K02.9]  Diagnosis Additional Information: No value filed.    Anesthesia Type:   General     Note:    Oropharynx: oral airway in place and spontaneously breathing  Level of Consciousness: awake  Oxygen Supplementation: face mask  Level of Supplemental Oxygen (L/min / FiO2): 10  Independent Airway: airway patency satisfactory and stable  Dentition: dentition changed ([place] tooth extracted) S/P dental procedure  Vital Signs Stable: post-procedure vital signs reviewed and stable  Report to RN Given: handoff report given  Patient transferred to: PACU    Handoff Report: Identifed the Patient, Identified the Reponsible Provider, Reviewed the pertinent medical history, Discussed the surgical course, Reviewed Intra-OP anesthesia mangement and issues during anesthesia, Set expectations for post-procedure period and Allowed opportunity for questions and acknowledgement of understanding      Vitals:  Vitals Value Taken Time   /61 11/09/23 1030   Temp 36.2  C (97.2  F) 11/09/23 1030   Pulse 73 11/09/23 1036   Resp 14 11/09/23 1036   SpO2 100 % 11/09/23 1036   Vitals shown include unfiled device data.    Electronically Signed By: Noemí Barton MD  November 9, 2023  10:36 AM

## 2023-11-12 NOTE — OP NOTE
[facility]   OR Dental Procedure Note    Patient:   Jose Melgar    Date of birth 1990   MRN:  [id number]   Date of Visit:  11/12/2023   Date of Admission 11/9/2023     Treatment Completed   General Anesthesia Start:  Jose Melgar is a 32 year old male brought into the operating room and draped in the usual customary [facility] fashion. Following the time-out procedures, the patient was placed under general anesthesia care via Oral cavity.    Under GA, the following treatments were performed:   Bilateral dental examination,   FMX radiographs were taken and reviewed.  Moist throat pack was placed, betadine applied circumferentially to oral cavity.   Pre-procedural rinse included: Chlorhexidine 0.12% solution followed by a 50/50 solution of sterile saline and hydrogen peroxide.    Periodontal Treatment: Full mouth prophylaxis: Bulk debridement completed with the Cavitron, followed use of hand scalers as necessary. Slow speed polish with medium grit polishing paste. All contacts flossed.        Local Anesthetic:  1.7_cc  Lidocaine 2% w/ epi 1:100,000    Given via PSA/MSA/ASA nerve block, Buccal infiltration, GABBY nerve block, Long Buccal Block on the Right mandible    Surgical extraction of tooth #32-lower right 3rd molar   Full thickness flap created with the #15 surgical blade, along the alveolar ridge of #32 and extending to the Distal of #30, tissue reflected with #9, periosteal bone removed from MB of #32 , tooth 32 sectioned, followed by a series of elevators and forceps, alveoplasty completed, Surgicel placed in extraction site(s), 3-0 chromic gut suture placed.     Sodium Fluoride Varnish 5% placed on remaining teeth.   #28 Mandibular first premolar occlusal surface was polished.  Throat pack placed at: 0842  Throat pack removed at: 0955  The oropharynx was inspected and found to be clear.   Estimated blood loss: 5  (mL)      Dental procedure Finish:  After the dental procedure, the patient was transferred to recovery room in good condition under the lead of the CRNA.The patient s family was informed by the dental team about the dental findings and procedures performed. Verbal and written post-op instructions given. All questions and concerns were invited and answered, patient and patient's family left pleased with treatment.       Clinic contact information:   [facility]  [facility] [address] [address]  Phone:[phone]    Pre - Procedure   Patient name: Jose Melgar  : 1990  Medical record number:  [id number]  Dental procedures performed on: 2023  It was deemed necessary for this patient to be seen in the hospital operating room because pt is not able to be seen in clinic due to: Uncontrolled tremors/seizures    Pre-procedure with patient & guardian:   Consent: Risks complications including but not limited to post-operative pain, swelling, bleeding, infection, temporary/permanent paresthesia/anesthesia of CN V3, lingual nerve, failure to resolve chief complaint. Patient /'s guardian(s) agreed to procedure as written and signed consent after all questions were invited and answered.    Providers     Dental attending:Roberto Reynoso DDS,   Dental resident Danny Cash DMD, Fellow  Anesthesiologist:Jigna Muñoz MD   CRNA:Stan Mary APRN CRNA   Anesthesiology resident:Noemí Barton MD   Scrub RN: Libra Patel RN: Shakira Miguel RN       Patient review   Patient Health history: History is obtained from the patient's parent(s)  The 10 point Review of Systems is negative other than noted in the HPI and pertinent information mentioned above.     History of Present Illness:  Jose Melgar is a 32 year old male who presents to the  OR for comprehensive dental treatment.     ASA 3 - Patient with moderate systemic disease with functional limitations    Physical Exam:  Vitals were reviewed                       Medical, Surgical, Social History       Past Medical History:   Diagnosis Date    Seizures (H)     Spastic quadriplegia (H)          Past Surgical History:   Procedure Laterality Date    EXAM UNDER ANESTHESIA, RESTORATIONS, EXTRACTION(S) DENTAL COMPLEX, COMBINED  1/23/2013    Procedure: COMBINED EXAM UNDER ANESTHESIA, RESTORATIONS, EXTRACTION(S) DENTAL COMPLEX;  Dental Exam,   Radiograph,   Restorations,   Periodontal Therapy,   Extractions x1,   sealants x15;  Surgeon: Marcelo Sellers DDS;  Location: [facility] OR    EXAM UNDER ANESTHESIA, RESTORATIONS, EXTRACTION(S) DENTAL COMPLEX, COMBINED N/A 11/9/2023    Procedure: Bilateral dental exam, radiographs, dental extractions x1, periodontal therapy, fluoride varnish in the mouth;  Surgeon: Roberto Reynoso DDS;  Location: [facility] OR    EXAM UNDER ANESTHESIA, RESTORATIONS, EXTRACTION(S) DENTAL, COMBINED N/A 9/6/2017    Procedure: COMBINED EXAM UNDER ANESTHESIA, RESTORATIONS, EXTRACTION(S) DENTAL;  Dental Exam, Radiographs, Periodontal Therapy, Floride Varnish ;  Surgeon: Endy Brenner DDS;  Location: [facility] OR    RESECTION ILEOCECAL           Social History     Tobacco Use    Smoking status: Never    Smokeless tobacco: Never   Substance Use Topics    Alcohol use: No         History reviewed. No pertinent family history.      Immunizations and Allergies     Immunization History   Administered Date(s) Administered    COVID-19 12+ (2023-24) (MODERNA) 10/23/2023    COVID-19 Monovalent 18+ (Moderna) 04/12/2021, 05/10/2021, 11/27/2021         No Known Allergies      Medication     Prior to Admission Medications       No current Epic-ordered facility-administered medications on file.     Current Outpatient Medications Ordered in Epic   Medication    acetaminophen (TYLENOL) 160 MG/5ML oral liquid     albuterol (2.5 MG/3ML) 0.083% nebulizer solution    budesonide (PULMICORT) 0.5 MG/2ML neb solution    levETIRAcetam (KEPPRA) 500 MG tablet         Exam and Assessment    Jose Melgar is a 32 year old male that presented to the OR at [facility] due to Pre-procedure diagnosis: Chronic gingivitis; plaque induced, K05.10 and Dental caries, unspecified, K02.9     Extra-Oral: No submandibular lymphadenopathy, no induration or erythema, no abnormal skin lesions, inferior border of mandible is palpable bilaterally, TITUS >45mm. No physical impediment from TMJ bilaterally. No clicking of TMJ on opening and lateral movements.    Intraoral exam: Reveals heavy plaque, moderate calculus, moderate bleeding on probing, sealant void on #29. Deep pocket 7mm on distal of #32, decayed/fractured/poor dentition. Mallampati Class III (visualization of the soft palate and base of uvula). Generalized wear on posterior and anterior.   No abnormal soft tissue findings upon intraoral examination    Probing depth range (mm):  Upper right: 2-5  Upper left: 1-4  Lower left: 1-5  Lower right: 2-7    Radiographic exam: Radiographs revealed no periradicular or periapical radiolucent lesions  associated with teeth , no coronal radiolucent lesions consistent with decay on teeth ,  radiopacities on teeth  consistent with restorations on teeth,  mild RBL noted   Abnormal findings include:     The post-operative diagnosis was Same as pre-operative diagnosis

## (undated) DEVICE — PREP POVIDONE-IODINE 10% SOLUTION 4OZ BOTTLE MDS093944

## (undated) DEVICE — TOOTHBRUSH ADULT NON STERILE MDS136850

## (undated) DEVICE — PREP POVIDONE IODINE SWABSTICKS TRIPLE PACK MDS093902A

## (undated) DEVICE — BASIN SET MAJOR

## (undated) DEVICE — SOL WATER IRRIG 1000ML BOTTLE 2F7114

## (undated) DEVICE — PREP POVIDONE IODINE SOLUTION 10% 120ML

## (undated) DEVICE — ESU GROUND PAD UNIVERSAL W/O CORD

## (undated) DEVICE — SPONGE SURGIFOAM 12 1972

## (undated) DEVICE — TUBING SUCTION MEDI-VAC 1/4"X20' N620A

## (undated) DEVICE — SU CHROMIC 3-0 FS-2 27" 636

## (undated) DEVICE — PACK SET-UP STD 9102

## (undated) DEVICE — GLOVE PROTEXIS W/NEU-THERA 8.5  2D73TE85

## (undated) DEVICE — LINEN ORTHO PACK 5446

## (undated) DEVICE — SYR EAR 3OZ BULB IRR STRL DISP BLU PVC 4173

## (undated) DEVICE — SOLIDIFIER (USE FOR UP TO 1500CC) MSOLID1500

## (undated) DEVICE — LIGHT HANDLE X2

## (undated) DEVICE — POSITIONER ARMBOARD FOAM 1PAIR LF FP-ARMB1

## (undated) DEVICE — COVER PROBE ULTRASOUND 3D W/GEL 5X96" LF 20-P3D596

## (undated) DEVICE — SOL NACL 0.9% IRRIG 1000ML BOTTLE 2F7124

## (undated) DEVICE — SUCTION CANISTER MEDIVAC LINER 1500ML W/LID 65651-515

## (undated) DEVICE — SOL HYDROGEN PEROXIDE 3% 4OZ BOTTLE F0010

## (undated) DEVICE — DRSG JAWBRA  95

## (undated) DEVICE — WIPES FOLEY CARE SURESTEP PROVON DFC100

## (undated) DEVICE — LINEN GOWN X4 5410

## (undated) DEVICE — STRAP KNEE/BODY 31143004

## (undated) DEVICE — LINEN GOWN OVERSIZE 5408

## (undated) DEVICE — SUCTION TIP YANKAUER W/O VENT K86

## (undated) DEVICE — BUR STRK SIDE CUT 1.6X5.1X44.8MM CARBIDE 6FLUTE 1607-002-107

## (undated) DEVICE — BLADE KNIFE SURG 15 371115

## (undated) DEVICE — GLOVE RADIATION RESISTANT SZ 8.5  95-397

## (undated) DEVICE — Device

## (undated) DEVICE — ANTIFOG SOLUTION W/FOAM PAD 31142527

## (undated) DEVICE — OINTMENT ANTIBIOTIC BACITRACIN ZINC .9 G 1171

## (undated) DEVICE — GLOVE BIOGEL PI MICRO SZ 8.0 48580

## (undated) DEVICE — SYR EAR BULB 3OZ 0035830

## (undated) DEVICE — RX BACITRACIN OINTMENT 0.9G 1/32OZ 01680 11109

## (undated) DEVICE — SPONGE RAY-TEC 4X8" 7318

## (undated) DEVICE — PREP POVIDONE IODINE 10% SWABSTICKS TRIPLE PACK AS-PVPSBPT

## (undated) RX ORDER — CEFAZOLIN SODIUM 1 G/3ML
INJECTION, POWDER, FOR SOLUTION INTRAMUSCULAR; INTRAVENOUS
Status: DISPENSED
Start: 2017-09-06

## (undated) RX ORDER — LIDOCAINE HYDROCHLORIDE 20 MG/ML
INJECTION, SOLUTION EPIDURAL; INFILTRATION; INTRACAUDAL; PERINEURAL
Status: DISPENSED
Start: 2017-09-06

## (undated) RX ORDER — CEFAZOLIN SODIUM 2 G/100ML
INJECTION, SOLUTION INTRAVENOUS
Status: DISPENSED
Start: 2017-09-06

## (undated) RX ORDER — ONDANSETRON 2 MG/ML
INJECTION INTRAMUSCULAR; INTRAVENOUS
Status: DISPENSED
Start: 2023-11-09

## (undated) RX ORDER — ROCURONIUM BROMIDE 50 MG/5 ML
SYRINGE (ML) INTRAVENOUS
Status: DISPENSED
Start: 2017-09-06

## (undated) RX ORDER — ONDANSETRON 2 MG/ML
INJECTION INTRAMUSCULAR; INTRAVENOUS
Status: DISPENSED
Start: 2017-09-06

## (undated) RX ORDER — CHLORHEXIDINE GLUCONATE ORAL RINSE 1.2 MG/ML
SOLUTION DENTAL
Status: DISPENSED
Start: 2023-11-09

## (undated) RX ORDER — FENTANYL CITRATE-0.9 % NACL/PF 10 MCG/ML
PLASTIC BAG, INJECTION (ML) INTRAVENOUS
Status: DISPENSED
Start: 2023-11-09

## (undated) RX ORDER — DEXAMETHASONE SODIUM PHOSPHATE 4 MG/ML
INJECTION, SOLUTION INTRA-ARTICULAR; INTRALESIONAL; INTRAMUSCULAR; INTRAVENOUS; SOFT TISSUE
Status: DISPENSED
Start: 2017-09-06

## (undated) RX ORDER — CHLORHEXIDINE GLUCONATE ORAL RINSE 1.2 MG/ML
SOLUTION DENTAL
Status: DISPENSED
Start: 2017-09-06

## (undated) RX ORDER — FENTANYL CITRATE 50 UG/ML
INJECTION, SOLUTION INTRAMUSCULAR; INTRAVENOUS
Status: DISPENSED
Start: 2023-11-09

## (undated) RX ORDER — DEXAMETHASONE SODIUM PHOSPHATE 4 MG/ML
INJECTION, SOLUTION INTRA-ARTICULAR; INTRALESIONAL; INTRAMUSCULAR; INTRAVENOUS; SOFT TISSUE
Status: DISPENSED
Start: 2023-11-09

## (undated) RX ORDER — FENTANYL CITRATE 50 UG/ML
INJECTION, SOLUTION INTRAMUSCULAR; INTRAVENOUS
Status: DISPENSED
Start: 2017-09-06

## (undated) RX ORDER — PROPOFOL 10 MG/ML
INJECTION, EMULSION INTRAVENOUS
Status: DISPENSED
Start: 2017-09-06

## (undated) RX ORDER — ACETAMINOPHEN 325 MG/10.15ML
LIQUID ORAL
Status: DISPENSED
Start: 2023-11-09

## (undated) RX ORDER — PHENYLEPHRINE HCL IN 0.9% NACL 1 MG/10 ML
SYRINGE (ML) INTRAVENOUS
Status: DISPENSED
Start: 2017-09-06

## (undated) RX ORDER — MIDAZOLAM HYDROCHLORIDE 2 MG/ML
SYRUP ORAL
Status: DISPENSED
Start: 2023-11-09

## (undated) RX ORDER — BUPIVACAINE HYDROCHLORIDE AND EPINEPHRINE 2.5; 5 MG/ML; UG/ML
INJECTION, SOLUTION INFILTRATION; PERINEURAL
Status: DISPENSED
Start: 2017-09-06